# Patient Record
Sex: MALE | Race: WHITE | NOT HISPANIC OR LATINO
[De-identification: names, ages, dates, MRNs, and addresses within clinical notes are randomized per-mention and may not be internally consistent; named-entity substitution may affect disease eponyms.]

---

## 2019-06-21 ENCOUNTER — TRANSCRIPTION ENCOUNTER (OUTPATIENT)
Age: 44
End: 2019-06-21

## 2020-09-28 ENCOUNTER — EMERGENCY (EMERGENCY)
Facility: HOSPITAL | Age: 45
LOS: 0 days | Discharge: HOME | End: 2020-09-28
Payer: OTHER MISCELLANEOUS

## 2020-09-28 VITALS
RESPIRATION RATE: 19 BRPM | SYSTOLIC BLOOD PRESSURE: 120 MMHG | OXYGEN SATURATION: 98 % | WEIGHT: 175.05 LBS | DIASTOLIC BLOOD PRESSURE: 69 MMHG | TEMPERATURE: 98 F | HEART RATE: 88 BPM

## 2020-09-28 PROCEDURE — 99283 EMERGENCY DEPT VISIT LOW MDM: CPT

## 2020-09-28 PROCEDURE — 73080 X-RAY EXAM OF ELBOW: CPT | Mod: 26,RT

## 2020-09-28 RX ORDER — IBUPROFEN 200 MG
600 TABLET ORAL ONCE
Refills: 0 | Status: COMPLETED | OUTPATIENT
Start: 2020-09-28 | End: 2020-09-28

## 2020-09-28 RX ORDER — TETANUS TOXOID, REDUCED DIPHTHERIA TOXOID AND ACELLULAR PERTUSSIS VACCINE, ADSORBED 5; 2.5; 8; 8; 2.5 [IU]/.5ML; [IU]/.5ML; UG/.5ML; UG/.5ML; UG/.5ML
0.5 SUSPENSION INTRAMUSCULAR ONCE
Refills: 0 | Status: COMPLETED | OUTPATIENT
Start: 2020-09-28 | End: 2020-09-28

## 2020-09-28 RX ORDER — ERYTHROMYCIN BASE 5 MG/GRAM
1 OINTMENT (GRAM) OPHTHALMIC (EYE)
Qty: 1 | Refills: 0
Start: 2020-09-28 | End: 2020-10-02

## 2020-09-28 RX ADMIN — TETANUS TOXOID, REDUCED DIPHTHERIA TOXOID AND ACELLULAR PERTUSSIS VACCINE, ADSORBED 0.5 MILLILITER(S): 5; 2.5; 8; 8; 2.5 SUSPENSION INTRAMUSCULAR at 12:06

## 2020-09-28 RX ADMIN — Medication 600 MILLIGRAM(S): at 12:06

## 2020-09-28 NOTE — ED PROVIDER NOTE - CARE PROVIDER_API CALL
Dino Keys (DO)  Orthopaedic Surgery  [address] [place] [address]  Phone: [phone]  Fax: [phone]  Follow Up Time: 1-3 Days

## 2020-09-28 NOTE — ED PROVIDER NOTE - CARE PLAN
Principal Discharge DX:	Elbow injury, right, initial encounter  Secondary Diagnosis:	Corneal abrasion, left, initial encounter

## 2020-09-28 NOTE — ED ADULT TRIAGE NOTE - CHIEF COMPLAINT QUOTE
patient c/o of L eye pain and R elbow , patient stated " I slept down the crick like 12 feet , I did not fall, and one branches that I cut hit me on the eye , I wear the protective glasses but it got me" , this happened at work , denied hitting head no LOC patient c/o of L eye pain and R elbow , patient stated " I slipped down the crick like 12 feet , I did not fall, and one branches that I cut hit me on the eye , I wear the protective glasses but it got me" , this happened at work , denied hitting head no LOC

## 2020-09-28 NOTE — ED ADULT NURSE NOTE - NSIMPLEMENTINTERV_GEN_ALL_ED
Implemented All Universal Safety Interventions:  [place] to call system. Call bell, personal items and telephone within reach. Instruct patient to call for assistance. Room bathroom lighting operational. Non-slip footwear when patient is off stretcher. Physically safe environment: no spills, clutter or unnecessary equipment. Stretcher in lowest position, wheels locked, appropriate side rails in place.

## 2020-09-28 NOTE — ED PROVIDER NOTE - MUSCULOSKELETAL MINIMAL EXAM
+ TTP right elbow joint with decreased supination due to pain. Full flexion and extension. no TTP right wrist or shoulder. radial pulses equal and intact bilaterally.

## 2020-09-28 NOTE — ED ADULT NURSE NOTE - CHIEF COMPLAINT QUOTE
patient c/o of L eye pain and R elbow , patient stated " I slipped down the crick like 12 feet , I did not fall, and one branches that I cut hit me on the eye , I wear the protective glasses but it got me" , this happened at work , denied hitting head no LOC
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
## 2020-09-28 NOTE — ED ADULT NURSE NOTE - OBJECTIVE STATEMENT
pt A&Ox4, ambulating. pt c/o left eye pain and right elbow pain, pain 10. pt states pain feels sharp and aching. 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

## 2020-09-28 NOTE — ED PROVIDER NOTE - PATIENT PORTAL LINK FT
You can access the FollowMyHealth Patient Portal offered by [facility] by registering at the following website: [email or web address]. By joining [employer]’s FollowMyHealth portal, you will also be able to view your health information using other applications (apps) compatible with our system.

## 2020-09-28 NOTE — ED PROVIDER NOTE - CLINICAL SUMMARY MEDICAL DECISION MAKING FREE TEXT BOX
44 yo male with no significant pmh presents to the ED c/o right elbow pain and left eye pain s/p injury at work prior to arrival.  A/P: right elbow xray neg for acute fracture, placed in sling. left eye: + corneal abrasion, neg bernardo sign, no FB. Dced with erythromycin ointment. Recommend follow up with ortho and ophtho. Patient understands return precautions.

## 2020-09-28 NOTE — ED PROVIDER NOTE - NSFOLLOWUPCLINICS_GEN_ALL_ED_FT
[facility]  Ophthalmology  [address] [place] [address] [address]  [place] [address]  Phone: [phone]  Fax:   Follow Up Time: 1-3 Days
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
## 2020-09-29 DIAGNOSIS — S50.901A UNSPECIFIED SUPERFICIAL INJURY OF RIGHT ELBOW, INITIAL ENCOUNTER: ICD-10-CM

## 2020-09-29 DIAGNOSIS — S05.02XA INJURY OF CONJUNCTIVA AND CORNEAL ABRASION WITHOUT FOREIGN BODY, LEFT EYE, INITIAL ENCOUNTER: ICD-10-CM

## 2020-09-29 DIAGNOSIS — H57.11 OCULAR PAIN, RIGHT EYE: ICD-10-CM

## 2020-09-29 DIAGNOSIS — Z23 ENCOUNTER FOR IMMUNIZATION: ICD-10-CM
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
## 2021-02-09 ENCOUNTER — APPOINTMENT (OUTPATIENT)
Dept: INTERNAL MEDICINE | Facility: CLINIC | Age: 46
End: 2021-02-09
Payer: COMMERCIAL

## 2021-02-09 VITALS
SYSTOLIC BLOOD PRESSURE: 129 MMHG | DIASTOLIC BLOOD PRESSURE: 81 MMHG | WEIGHT: 192 LBS | HEIGHT: 72 IN | BODY MASS INDEX: 26.01 KG/M2 | OXYGEN SATURATION: 98 % | HEART RATE: 96 BPM | TEMPERATURE: 98.4 F

## 2021-02-09 DIAGNOSIS — Z82.49 FAMILY HISTORY OF ISCHEMIC HEART DISEASE AND OTHER DISEASES OF THE CIRCULATORY SYSTEM: ICD-10-CM

## 2021-02-09 DIAGNOSIS — Z83.438 FAMILY HISTORY OF OTHER DISORDER OF LIPOPROTEIN METABOLISM AND OTHER LIPIDEMIA: ICD-10-CM

## 2021-02-09 DIAGNOSIS — Z83.3 FAMILY HISTORY OF DIABETES MELLITUS: ICD-10-CM

## 2021-02-09 DIAGNOSIS — Z72.0 TOBACCO USE: ICD-10-CM

## 2021-02-09 DIAGNOSIS — Z00.00 ENCOUNTER FOR GENERAL ADULT MEDICAL EXAMINATION W/OUT ABNORMAL FINDINGS: ICD-10-CM

## 2021-02-09 DIAGNOSIS — Z11.3 ENCOUNTER FOR SCREENING FOR INFECTIONS WITH A PREDOMINANTLY SEXUAL MODE OF TRANSMISSION: ICD-10-CM

## 2021-02-09 DIAGNOSIS — Z11.59 ENCOUNTER FOR SCREENING FOR OTHER VIRAL DISEASES: ICD-10-CM

## 2021-02-09 DIAGNOSIS — R53.83 OTHER FATIGUE: ICD-10-CM

## 2021-02-09 PROCEDURE — 36415 COLL VENOUS BLD VENIPUNCTURE: CPT

## 2021-02-09 PROCEDURE — 99386 PREV VISIT NEW AGE 40-64: CPT | Mod: 25

## 2021-02-09 PROCEDURE — 99072 ADDL SUPL MATRL&STAF TM PHE: CPT

## 2021-02-10 DIAGNOSIS — R79.89 OTHER SPECIFIED ABNORMAL FINDINGS OF BLOOD CHEMISTRY: ICD-10-CM

## 2021-02-10 DIAGNOSIS — E78.5 HYPERLIPIDEMIA, UNSPECIFIED: ICD-10-CM

## 2021-02-10 LAB
25(OH)D3 SERPL-MCNC: 22.1 NG/ML
ALBUMIN SERPL ELPH-MCNC: 4.8 G/DL
ALP BLD-CCNC: 103 U/L
ALT SERPL-CCNC: 19 U/L
ANION GAP SERPL CALC-SCNC: 20 MMOL/L
AST SERPL-CCNC: 18 U/L
BASOPHILS # BLD AUTO: 0.04 K/UL
BASOPHILS NFR BLD AUTO: 0.4 %
BILIRUB SERPL-MCNC: <0.2 MG/DL
BUN SERPL-MCNC: 17 MG/DL
C TRACH RRNA SPEC QL NAA+PROBE: NOT DETECTED
CALCIUM SERPL-MCNC: 9.2 MG/DL
CHLORIDE SERPL-SCNC: 107 MMOL/L
CHOLEST SERPL-MCNC: 231 MG/DL
CO2 SERPL-SCNC: 16 MMOL/L
CREAT SERPL-MCNC: 0.83 MG/DL
EOSINOPHIL # BLD AUTO: 0.2 K/UL
EOSINOPHIL NFR BLD AUTO: 2.1 %
ESTIMATED AVERAGE GLUCOSE: 108 MG/DL
FERRITIN SERPL-MCNC: 96 NG/ML
FOLATE SERPL-MCNC: 9 NG/ML
GLUCOSE SERPL-MCNC: 86 MG/DL
HAV IGM SER QL: NONREACTIVE
HBA1C MFR BLD HPLC: 5.4 %
HBV CORE IGM SER QL: NONREACTIVE
HBV SURFACE AG SER QL: NONREACTIVE
HCT VFR BLD CALC: 51.3 %
HCV AB SER QL: NONREACTIVE
HCV S/CO RATIO: 0.08 S/CO
HDLC SERPL-MCNC: 56 MG/DL
HGB BLD-MCNC: 15.9 G/DL
HIV1+2 AB SPEC QL IA.RAPID: NONREACTIVE
IMM GRANULOCYTES NFR BLD AUTO: 0.2 %
IRON SATN MFR SERPL: 15 %
IRON SERPL-MCNC: 41 UG/DL
IRON SERPL-MCNC: 59 UG/DL
LDLC SERPL CALC-MCNC: 151 MG/DL
LYMPHOCYTES # BLD AUTO: 2.59 K/UL
LYMPHOCYTES NFR BLD AUTO: 27.2 %
MAN DIFF?: NORMAL
MCHC RBC-ENTMCNC: 26.2 PG
MCHC RBC-ENTMCNC: 31 GM/DL
MCV RBC AUTO: 84.7 FL
MONOCYTES # BLD AUTO: 0.57 K/UL
MONOCYTES NFR BLD AUTO: 6 %
N GONORRHOEA RRNA SPEC QL NAA+PROBE: NOT DETECTED
NEUTROPHILS # BLD AUTO: 6.09 K/UL
NEUTROPHILS NFR BLD AUTO: 64.1 %
NONHDLC SERPL-MCNC: 175 MG/DL
PLATELET # BLD AUTO: 282 K/UL
POTASSIUM SERPL-SCNC: 4.4 MMOL/L
PROT SERPL-MCNC: 7.1 G/DL
RBC # BLD: 6.06 M/UL
RBC # FLD: 14 %
SARS-COV-2 IGG SERPL IA-ACNC: 0.06 INDEX
SARS-COV-2 IGG SERPL QL IA: NEGATIVE
SODIUM SERPL-SCNC: 142 MMOL/L
SOURCE AMPLIFICATION: NORMAL
T PALLIDUM AB SER QL IA: NEGATIVE
TIBC SERPL-MCNC: 400 UG/DL
TRIGL SERPL-MCNC: 121 MG/DL
TSH SERPL-ACNC: 2.76 UIU/ML
UIBC SERPL-MCNC: 341 UG/DL
VIT B12 SERPL-MCNC: 440 PG/ML
WBC # FLD AUTO: 9.51 K/UL

## 2021-09-27 ENCOUNTER — EMERGENCY (EMERGENCY)
Facility: HOSPITAL | Age: 46
LOS: 0 days | Discharge: HOME | End: 2021-09-27
Attending: EMERGENCY MEDICINE
Payer: OTHER MISCELLANEOUS

## 2021-09-27 ENCOUNTER — NON-APPOINTMENT (OUTPATIENT)
Age: 46
End: 2021-09-27

## 2021-09-27 VITALS
HEIGHT: 72 IN | RESPIRATION RATE: 20 BRPM | SYSTOLIC BLOOD PRESSURE: 160 MMHG | TEMPERATURE: 98 F | OXYGEN SATURATION: 97 % | WEIGHT: 190.04 LBS | HEART RATE: 117 BPM | DIASTOLIC BLOOD PRESSURE: 80 MMHG

## 2021-09-27 DIAGNOSIS — T78.40XA ALLERGY, UNSPECIFIED, INITIAL ENCOUNTER: ICD-10-CM
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
## 2021-09-27 DIAGNOSIS — Z91.030 BEE ALLERGY STATUS: ICD-10-CM

## 2021-09-27 PROCEDURE — 99291 CRITICAL CARE FIRST HOUR: CPT

## 2021-09-27 RX ORDER — SODIUM CHLORIDE 9 MG/ML
1000 INJECTION INTRAMUSCULAR; INTRAVENOUS; SUBCUTANEOUS ONCE
Refills: 0 | Status: DISCONTINUED | OUTPATIENT
Start: 2021-09-27 | End: 2021-09-27

## 2021-09-27 RX ORDER — DIPHENHYDRAMINE HCL 50 MG
50 CAPSULE ORAL ONCE
Refills: 0 | Status: DISCONTINUED | OUTPATIENT
Start: 2021-09-27 | End: 2021-09-27

## 2021-09-27 RX ORDER — DEXAMETHASONE 0.5 MG/5ML
10 ELIXIR ORAL ONCE
Refills: 0 | Status: DISCONTINUED | OUTPATIENT
Start: 2021-09-27 | End: 2021-09-27

## 2021-09-27 RX ORDER — FAMOTIDINE 10 MG/ML
20 INJECTION INTRAVENOUS ONCE
Refills: 0 | Status: DISCONTINUED | OUTPATIENT
Start: 2021-09-27 | End: 2021-09-27

## 2021-09-27 RX ORDER — DIPHENHYDRAMINE HCL 50 MG
1 CAPSULE ORAL
Qty: 15 | Refills: 0
Start: 2021-09-27 | End: 2021-10-01

## 2021-09-27 RX ORDER — EPINEPHRINE 0.3 MG/.3ML
0.3 INJECTION INTRAMUSCULAR; SUBCUTANEOUS
Qty: 1 | Refills: 0
Start: 2021-09-27 | End: 2021-09-27

## 2021-09-27 RX ORDER — DIPHENHYDRAMINE HCL 50 MG
50 CAPSULE ORAL ONCE
Refills: 0 | Status: COMPLETED | OUTPATIENT
Start: 2021-09-27 | End: 2021-09-27

## 2021-09-27 RX ORDER — DEXAMETHASONE 0.5 MG/5ML
10 ELIXIR ORAL ONCE
Refills: 0 | Status: COMPLETED | OUTPATIENT
Start: 2021-09-27 | End: 2021-09-27

## 2021-09-27 RX ORDER — EPINEPHRINE 0.3 MG/.3ML
0.3 INJECTION INTRAMUSCULAR; SUBCUTANEOUS ONCE
Refills: 0 | Status: COMPLETED | OUTPATIENT
Start: 2021-09-27 | End: 2021-09-27

## 2021-09-27 RX ORDER — FAMOTIDINE 10 MG/ML
20 INJECTION INTRAVENOUS ONCE
Refills: 0 | Status: COMPLETED | OUTPATIENT
Start: 2021-09-27 | End: 2021-09-27

## 2021-09-27 RX ADMIN — Medication 10 MILLIGRAM(S): at 10:22

## 2021-09-27 RX ADMIN — FAMOTIDINE 20 MILLIGRAM(S): 10 INJECTION INTRAVENOUS at 10:22

## 2021-09-27 RX ADMIN — EPINEPHRINE 0.3 MILLIGRAM(S): 0.3 INJECTION INTRAMUSCULAR; SUBCUTANEOUS at 09:33

## 2021-09-27 RX ADMIN — Medication 50 MILLIGRAM(S): at 10:21

## 2021-09-27 NOTE — ED PROVIDER NOTE - CARE PROVIDER_API CALL
REGINA CARIAS  Allergy  [address], [address] [place] [address]  Phone: [phone]  Fax: [phone]  Follow Up Time: 1-3 Days

## 2021-09-27 NOTE — ED PROVIDER NOTE - OBJECTIVE STATEMENT
46 year old male w/PMH of allergy to bees presents to the ED for allergic reaction. Pt was working for the [employer] and must've hit a bee hive and was swarmed x10 minutes prior to ED presentation. Pt with difficulty breathing and throat discomfort, states he usually has epi pen on him but not today. Denies itching or rash. Pt reports pain around multiple bee stings of hand, neck and face, likely about x5-6 stings as per pt.

## 2021-09-27 NOTE — ED PROVIDER NOTE - CLINICAL SUMMARY MEDICAL DECISION MAKING FREE TEXT BOX
Pt given IM epinephrine, decadron and benadryl. Will observe in ED for continued improvement. Discharge on benadryl upon continued improvement and will refill epi pen to pharmacy. Pt given IM epinephrine, decadron and benadryl for possible early anaphylactic reaction. Will observe in ED for continued improvement. Discharge on benadryl upon continued improvement and will refill epi pen to pharmacy. Pt given IM epinephrine, decadron and benadryl for possible early anaphylactic reaction. Will observe in ED for continued improvement. Discharge on benadryl upon continued improvement and will refill epi pen to pharmacy.     Pt observed in ED for 4 hours without recurrence of symptoms - will dc .

## 2021-09-27 NOTE — ED PROVIDER NOTE - NSFOLLOWUPINSTRUCTIONS_ED_ALL_ED_FT
Anaphylaxis    WHAT YOU NEED TO KNOW:    Anaphylaxis is a life-threatening allergic reaction that must be treated immediately. Your risk for anaphylaxis increases if you have asthma that is severe or not controlled. Medical conditions such as heart disease can also increase your risk. It is important to be prepared if you are at risk for anaphylaxis. Your symptoms can be worse each time you are exposed to the trigger.     DISCHARGE INSTRUCTIONS:    Steps to take for signs or symptoms of anaphylaxis:   •Immediately give 1 shot of epinephrine only into the outer thigh muscle. Even if your allergic reaction seems mild, it can quickly become anaphylaxis. This may happen even if you had a mild reaction to the allergen in the past. Each exposure can cause a different reaction. Watch for signs and symptoms of anaphylaxis every time you are exposed to a trigger. Be ready to give a shot of epinephrine. It is okay to inject epinephrine through clothing. Just be careful to avoid seams, zippers, or other parts that can prevent the needle from entering your skin.  How to Give An Epinephrine Shot Adult           •Leave the shot in place as directed. Your healthcare provider may recommend you leave it in place for up to 10 seconds before you remove it. This helps make sure all of the epinephrine is delivered.       •Call 911 and go to the emergency department, even if the shot improved symptoms. Do not drive yourself. Bring the used epinephrine shot with you.       Call 911 for any of the following:   •You have a skin rash, hives, swelling, or itching.       •You have trouble breathing, shortness of breath, wheezing, or coughing.      •Your throat tightens or your lips or tongue swell.      •You have difficulty swallowing or speaking.      •You are dizzy, lightheaded, confused, or feel like you are going to faint.      •You have nausea, diarrhea, or abdominal cramps, or you are vomiting.      Return to the emergency department if:   •Signs or symptoms of anaphylaxis return.           Contact your healthcare provider if:   •You have questions or concerns about your condition or care.          Medicines:   •Epinephrine is medicine used to treat severe allergic reactions such as anaphylaxis. It is given as a shot into the outer thigh muscle.      •Medicines such as antihistamines, steroids, and bronchodilators decrease inflammation, open airways, and make breathing easier.      •Take your medicine as directed. Contact your healthcare provider if you think your medicine is not helping or if you have side effects. Tell him or her if you are allergic to any medicine. Keep a list of the medicines, vitamins, and herbs you take. Include the amounts, and when and why you take them. Bring the list or the pill bottles to follow-up visits. Carry your medicine list with you in case of an emergency.      Follow up with your healthcare provider as directed: Allergy testing may reveal allergies that can trigger anaphylaxis. Write down your questions so you remember to ask them during your visits.     Safety precautions:   •Keep 2 shots of epinephrine with you at all times. You may need a second shot, because epinephrine only works for about 20 minutes and symptoms may return. Your healthcare provider can show you and family members how to give the shot. Check the expiration date every month and replace it before it expires.      •Create an action plan. Your healthcare provider can help you create a written plan that explains the allergy and an emergency plan to treat a reaction. The plan explains when to give a second epinephrine shot if symptoms return or do not improve after the first. Give copies of the action plan and emergency instructions to family members, and work or school staff. Show them how to give a shot of epinephrine in case you are not able to give it to yourself.      •Be careful when you exercise. If you have had exercise-induced anaphylaxis, do not exercise right after you eat. Stop exercising right away if you start to develop any signs or symptoms of anaphylaxis. You may first feel tired, warm, or have itchy skin. Hives, swelling, and severe breathing problems may develop if you continue to exercise.      •Carry medical alert identification. Wear medical alert jewelry or carry a card that explains the allergy. Ask your healthcare provider where to get these items.   Medical Alert Jewelry           •Identify and avoid known triggers. Read food labels for ingredients. Look for triggers in your environment.      •Ask about treatments to prevent anaphylaxis. You may need allergy shots or other medicines to treat allergies.

## 2021-09-27 NOTE — ED PROVIDER NOTE - PROGRESS NOTE DETAILS
improved symptoms after IM epinephrine - will give meds both orally and IM as pt was hard stick, would not like IV placed, otherwise improved clinically - so will medicate via alternative route.

## 2021-09-27 NOTE — ED ADULT TRIAGE NOTE - CHIEF COMPLAINT QUOTE
Bee sting allergy, throat closing sensation H/O Bee allergies, presents to triage purple with throat closing sensation

## 2021-09-27 NOTE — ED PROVIDER NOTE - CONSTITUTIONAL, MLM
normal... Well appearing, awake, alert, oriented to person, place, time/situation, Mild distress secondary to difficulty swallowing and speaking, immediately ordered for IM epinephrine 0.3mg

## 2021-09-27 NOTE — ED PROVIDER NOTE - SKIN, MLM
Skin normal color for race, warm, dry and intact. Multiple stings, slightly raised areas of skin congruent with bee stings around neck, face and right hand

## 2021-09-27 NOTE — ED ADULT NURSE NOTE - OBJECTIVE STATEMENT
Assisting primary RN. 46 year old male w/PMH of allergy to bees presents to the ED for allergic reaction. Pt was working for the [employer] and must've hit a bee hive and was swarmed x10 minutes prior to ED presentation. Pt with difficulty breathing and throat discomfort, states he usually has epi pen on him but not today. Denies itching or rash. Pt reports pain around multiple bee stings of hand, neck and face, likely about x5-6 stings as per pt

## 2021-09-27 NOTE — ED PROVIDER NOTE - PATIENT PORTAL LINK FT
You can access the FollowMyHealth Patient Portal offered by [facility] by registering at the following website: [email or web address]. By joining [employer]’s FollowMyHealth portal, you will also be able to view your health information using other applications (apps) compatible with our system.

## 2021-09-27 NOTE — ED PROVIDER NOTE - CRITICAL CARE ATTENDING CONTRIBUTION TO CARE
early anaphylactic reaction, was able to be  improved with IM epinephrine with improved status, observed in ED for 4-5 hours to ensure no further reaction occurrence.

## 2022-01-05 NOTE — ED ADULT NURSE NOTE - AS SC BRADEN MOBILITY
What Type Of Note Output Would You Prefer (Optional)?: Standard Output (4) no limitation What Is The Reason For Today's Visit?: Full Body Skin Examination What Is The Reason For Today's Visit? (Being Monitored For X): surveillance against the recurrence of atypical nevi How Severe Are Your Spot(S)?: mild

## 2022-01-26 NOTE — ED ADULT TRIAGE NOTE - ESI TRIAGE ACUITY LEVEL, MLM
He returns today for evaluation after EMG of his right arm which demonstrates right carpal tunnel syndrome which is moderately severe. He also is noted to have the same degree of issues with the left hand. That hand on left side is relatively asymptomatic. At this time because of failure to improve with conservative care over several months with persistent pain despite conservative management some medication and bracing I would recommend proceeding with right carpal tunnel release. The patient was counseled at length about the risks of marcella Covid-19 during their perioperative period and any recovery window from their procedure. The patient was made aware that marcella Covid-19  may worsen their prognosis for recovering from their procedure  and lend to a higher morbidity and/or mortality risk. All material risks, benefits, and reasonable alternatives including postponing the procedure were discussed. The patient does wish to proceed with the procedure at this time. INFORMED CONSENT NOTE        We discussed the risks, benefits, and alternatives to the proposed procedure, as well as the necessity of other members of the healthcare team participating in the procedure. All questions were answered and the patient elected to proceed with the proposed procedure and signed the informed consent form.
4

## 2022-04-11 PROBLEM — Z11.59 SCREENING FOR VIRAL DISEASE: Status: ACTIVE | Noted: 2021-02-09

## 2022-06-20 NOTE — ED ADULT NURSE NOTE - FINAL NURSING ELECTRONIC SIGNATURE
Task deferred to schedulers to place pt for last minute cancellation list via staff message.    Replied to pt via [employer] requesting more information regarding concerns, awaiting pt response.     28-Sep-2020 12:42

## 2023-05-20 ENCOUNTER — EMERGENCY (EMERGENCY)
Facility: HOSPITAL | Age: 48
LOS: 1 days | Discharge: HOME | End: 2023-05-20
Admitting: EMERGENCY MEDICINE
Payer: COMMERCIAL

## 2023-05-20 VITALS
TEMPERATURE: 98 F | RESPIRATION RATE: 18 BRPM | HEART RATE: 101 BPM | SYSTOLIC BLOOD PRESSURE: 156 MMHG | DIASTOLIC BLOOD PRESSURE: 90 MMHG | OXYGEN SATURATION: 100 %

## 2023-05-20 VITALS
TEMPERATURE: 98 F | SYSTOLIC BLOOD PRESSURE: 151 MMHG | RESPIRATION RATE: 16 BRPM | DIASTOLIC BLOOD PRESSURE: 100 MMHG | HEART RATE: 109 BPM | OXYGEN SATURATION: 100 %

## 2023-05-20 PROCEDURE — 70486 CT MAXILLOFACIAL W/O DYE: CPT | Mod: 26,MG

## 2023-05-20 PROCEDURE — 99284 EMERGENCY DEPT VISIT MOD MDM: CPT

## 2023-05-20 PROCEDURE — G1004: CPT

## 2023-05-20 RX ORDER — NICOTINE POLACRILEX 2 MG
4 GUM BUCCAL ONCE
Refills: 0 | Status: COMPLETED | OUTPATIENT
Start: 2023-05-20 | End: 2023-05-20

## 2023-05-20 RX ADMIN — Medication 4 MILLIGRAM(S): at 19:50

## 2023-05-20 NOTE — ED PROVIDER NOTE - CLINICAL SUMMARY MEDICAL DECISION MAKING FREE TEXT BOX
46 y/o M    CT maxillofacial .  Medical evaluation performed. There is no clinical evidence of intoxication or any acute medical problem requiring immediate intervention.  D/C home

## 2023-05-20 NOTE — ED PROVIDER NOTE - OBJECTIVE STATEMENT
48 y/o M   present to  ER w c/o right jaw pain .  Admit that he was recently discharged from a rehab facility 2 days ago fro heroin abuse  . States that he slipped and fell hitting his right jar. Denies hitting his head. Denies SI/HI/AHVH. Denies punching or kicking any objects. Denies  SOB,  fever, chills, chest/abdominal discomfort. Denies recent use of alcohol or illicit drugs. No evidence of physical injuries,  broken skin or deformities.

## 2023-05-20 NOTE — ED ADULT NURSE NOTE - OBJECTIVE STATEMENT
Received pt in  pt requesting Received pt in  pt calm denies si/hi/avh presently, pt requesting ct scan safety & comfort measures maintained eval on going.
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
## 2023-05-20 NOTE — ED ADULT NURSE NOTE - NSFALLUNIVINTERV_ED_ALL_ED
Bed/Stretcher in lowest position, wheels locked, appropriate side rails in place/Call bell, personal items and telephone in reach/Instruct patient to call for assistance before getting out of bed/chair/stretcher/Non-slip footwear applied when patient is off stretcher/[place] to call system/Physically safe environment - no spills, clutter or unnecessary equipment/Purposeful proactive rounding/Room/bathroom lighting operational, light cord in reach

## 2023-05-20 NOTE — ED PROVIDER NOTE - PATIENT PORTAL LINK FT
You can access the FollowMyHealth Patient Portal offered by [facility] by registering at the following website: [email or web address]. By joining [employer]’s FollowMyHealth portal, you will also be able to view your health information using other applications (apps) compatible with our system.

## 2023-05-21 NOTE — ED POST DISCHARGE NOTE - REASON FOR FOLLOW-UP
patient returned to the ED requesting a copy of his discharge papers-papers printed and given to him by martha wiggins. Other

## 2024-08-13 ENCOUNTER — EMERGENCY (EMERGENCY)
Facility: HOSPITAL | Age: 49
LOS: 1 days | Discharge: HOME | End: 2024-08-13
Attending: STUDENT IN AN ORGANIZED HEALTH CARE EDUCATION/TRAINING PROGRAM | Admitting: STUDENT IN AN ORGANIZED HEALTH CARE EDUCATION/TRAINING PROGRAM
Payer: COMMERCIAL

## 2024-08-13 VITALS
WEIGHT: 175.05 LBS | HEIGHT: 72 IN | OXYGEN SATURATION: 98 % | DIASTOLIC BLOOD PRESSURE: 85 MMHG | RESPIRATION RATE: 16 BRPM | HEART RATE: 92 BPM | TEMPERATURE: 98 F | SYSTOLIC BLOOD PRESSURE: 135 MMHG

## 2024-08-13 PROBLEM — F19.10 OTHER PSYCHOACTIVE SUBSTANCE ABUSE, UNCOMPLICATED: Chronic | Status: ACTIVE | Noted: 2023-05-21

## 2024-08-13 PROCEDURE — 99284 EMERGENCY DEPT VISIT MOD MDM: CPT

## 2024-08-13 PROCEDURE — 99283 EMERGENCY DEPT VISIT LOW MDM: CPT

## 2024-08-13 RX ORDER — FLUORESCEIN SODIUM 2 %
1 DROPS OPHTHALMIC (EYE) ONCE
Refills: 0 | Status: COMPLETED | OUTPATIENT
Start: 2024-08-13 | End: 2024-08-13

## 2024-08-13 RX ORDER — CIPROFLOXACIN 3 MG/ML
2 SOLUTION OPHTHALMIC ONCE
Refills: 0 | Status: COMPLETED | OUTPATIENT
Start: 2024-08-13 | End: 2024-08-13

## 2024-08-13 RX ORDER — TETRACAINE HCL 0.5 %
1 DROPS OPHTHALMIC (EYE) ONCE
Refills: 0 | Status: COMPLETED | OUTPATIENT
Start: 2024-08-13 | End: 2024-08-13

## 2024-08-13 RX ORDER — CIPROFLOXACIN 3 MG/ML
2 SOLUTION OPHTHALMIC
Qty: 1 | Refills: 0
Start: 2024-08-13 | End: 2024-08-19

## 2024-08-13 RX ADMIN — Medication 1 DROP(S): at 07:40

## 2024-08-13 RX ADMIN — Medication 1 APPLICATION(S): at 07:40

## 2024-08-13 RX ADMIN — CIPROFLOXACIN 2 DROP(S): 3 SOLUTION OPHTHALMIC at 08:15

## 2024-08-13 NOTE — ED PROVIDER NOTE - CLINICAL SUMMARY MEDICAL DECISION MAKING FREE TEXT BOX
History and exam concerning for corneal abrasion.  No foreign body seen under the eyelid.  Likely in the setting of the blowing grass yesterday with concern for abrasion that worsened overnight.  Patient's vision after tetracaine is 20/20 bilaterally with his glasses and in each individual eye.  Will give ciprofloxacin drops, plan for discharge, recommend ophthalmology follow-up in 48 hours.

## 2024-08-13 NOTE — ED ADULT NURSE NOTE - OBJECTIVE STATEMENT
Pt came in ambulatory complains of redness , pain and discharge on his left eye started this morning,

## 2024-08-13 NOTE — ED ADULT NURSE NOTE - CAS EDP DISCH TYPE
Closure device placed in the right femoral artery. Site closed by VASCADE. Manual pressure held. Home

## 2024-08-13 NOTE — ED PROVIDER NOTE - NSFOLLOWUPINSTRUCTIONS_ED_ALL_ED_FT
Corneal Abrasion  An eye showing the cornea and eyelid.   A corneal abrasion is a scratch or injury to the clear tissue at the front of the eye (cornea). It can be very painful. The cornea protects the eye and helps the eye focus. The cornea is made up of many layers, but the surface layer is one of the most sensitive tissues in the body.    A corneal abrasion heals fast when it is treated. If it is not treated, it can become infected and cause an open sore (ulcer). This can lead to scarring, and the scarring can affect vision. Sometimes after the abrasion heals, another abrasion can come back in the same place.    What are the causes?  A corneal abrasion may be caused by:  A poke to the eye.  A gritty or irritating substance (foreign body) in the eye.  Too much eye rubbing.  Very dry eyes.  Certain eye infections.  Contact lenses that do not fit right or are worn for too long. Injury can also happen when putting in contact lenses or taking them out.  Eye surgery.  Certain cornea problems that make it more likely to get a corneal abrasion.  Sometimes, the cause is not known.    What are the signs or symptoms?  Symptoms of this condition include:  Eye pain. The pain may get worse when you open, close, or move your eye.  A feeling that something is poking your eye or is stuck in your eye.  Tearing, redness, and sensitivity to light.  Having trouble keeping your eye open, or not being able to keep it open.  Blurred vision.  Headache.  How is this diagnosed?  A corneal abrasion may be diagnosed based on your medical history, symptoms, and an eye exam. You may need to see a doctor who specializes in conditions of the eye (ophthalmologist or optometrist).    Before the eye exam, you may be given eye drops that numb the eye. You may also have dye put in your eye with a dropper or a small paper strip. This dye helps your eye doctor see your injury better when using a light or an eye scope (slit lamp).    How is this treated?  Treatment may vary based on what caused the corneal abrasion. It may include:  Washing out your eye.  Taking out anything that is stuck in your eye.  Using antibiotic drops or ointment to treat or prevent an infection.  Using eye drops that lessens inflammation and pain.  Using steroid drops or ointment to treat redness, irritation, or inflammation.  Applying a cold, wet cloth (cold compress) or ice pack to lessen the pain.  Taking pain medicines. This may include over-the-counter medicines like ibuprofen. If you have a large or deep corneal abrasion, an opioid medicine may be prescribed.  For large abrasions, an eye patch or a bandage soft contact lens might also be used. A bandage soft contact lens protects the cornea while it is healing. These are not used if the corneal abrasion is related to wearing contact lenses. This is because you may be more likely to get an eye infection.    Follow these instructions at home:  Medicines    If you were prescribed eye drops or ointment, use them as told by your provider. You may be told to use:  Antibiotic eye drops or ointment. Do not stop using them even if you start to feel better.  Eye drops that moisten the eye (lubricating eye drops).  Ask your provider if the medicine prescribed to you:  Requires you to avoid driving or using machinery.  Can cause constipation. You may need to take these actions to prevent or treat constipation:  Drink enough fluid to keep your pee (urine) pale yellow.  Take over-the-counter or prescription medicines.  Eat foods that are high in fiber, such as beans, whole grains, and fresh fruits and vegetables.  Limit foods that are high in fat and processed sugars, such as fried or sweet foods.  Take over-the-counter and prescription medicines only as told by your provider.  Eye care    Rest your eyes. Avoid bright light and overusing (straining) your eyes. Wear sunglasses.  Do not rub or touch your eye. Do not wash out your eye.  Ask your provider if you can use a cold compress on your eye to lessen pain.  If you have an eye patch:  Wear it as told by your provider.  Follow your provider's instructions about when to take it off.  If you have a bandage soft contact lens, your provider will take it off during your follow-up visit.  Do not wear your own contact lenses until your provider says it is okay.  General instructions    Do not drive or use machinery as told by your provider. You may not be able to  distances as well if your vision is affected or if you are wearing an eye patch.  Keep all follow-up visits. This helps to prevent infection and vision loss.  Contact a health care provider if:  You keep having eye pain and other symptoms for more than 2–3 days.  You have new symptoms, such as more redness, tearing, or fluid (discharge) coming from your eye.  You have swollen eyelids.  Your vision gets much worse.  You have discharge that makes your eyelids stick together in the morning.  Your eye patch becomes so loose that you can blink your eye.  Symptoms come back after your abrasion heals.  Get help right away if:  You have severe eye pain that does not get better with medicine.  You have severe vision loss.  This information is not intended to replace advice given to you by your health care provider. Make sure you discuss any questions you have with your health care provider.

## 2024-08-13 NOTE — ED PROVIDER NOTE - NSFOLLOWUPCLINICS_GEN_ALL_ED_FT
[facility] - Ophthalmology  Ophthalmology  [address]  [place] [address]  Phone: [phone]  Fax:

## 2024-08-13 NOTE — ED PROVIDER NOTE - PATIENT PORTAL LINK FT
You can access the FollowMyHealth Patient Portal offered by [facility] by registering at the following website: [email or web address]. By joining [employer]’s FollowMyHealth portal, you will also be able to view your health information using other applications (apps) compatible with our system.

## 2024-08-13 NOTE — ED PROVIDER NOTE - PHYSICAL EXAMINATION
Constitutional: Awake, Alert, non-toxic. No acute distress.  HEAD: Normocephalic, atraumatic.   EYES: EOM intact, left conjunctiva and sclera with minimal erythema. very small fluorescein uptake just medial to iris of left eye, 20/20 with glasses b/l and each individual eye  ENT: External ears normal. No rhinorrhea, no tracheal deviation   NECK: Supple, non-tender  CARDIOVASCULAR: regular rate  RESPIRATORY: Normal respiratory effort; Speaking in full sentences. No accessory muscle use.   MSK:  no lower extremity edema, no deformities  SKIN: Warm, dry  NEURO: A&O x3. Sensory and motor functions are grossly intact. Speech is normal.  PSYCH: Appearance and judgement seem appropriate for gender and age.

## 2024-08-13 NOTE — ED ADULT NURSE NOTE - NS ED NURSE DC INFO COMPLEXITY
Patient came in office requesting these ointments and creams. Simple: Patient demonstrates quick and easy understanding/Verbalized Understanding

## 2024-08-13 NOTE — ED PROVIDER NOTE - OBJECTIVE STATEMENT
Patient with no significant past medical history who does not wear contacts is presenting with pain, drainage and erythema to left eye.  Noted this morning when he woke up.  States it hurts when he is looking around or closes his eye.  Feels that it is hard to see because of the pain and burning.  No other complaints or issues.  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  Did not note the symptoms till overnight.

## 2025-02-07 ENCOUNTER — EMERGENCY (EMERGENCY)
Facility: HOSPITAL | Age: 50
LOS: 1 days | Discharge: HOME | End: 2025-02-07
Admitting: EMERGENCY MEDICINE
Payer: COMMERCIAL

## 2025-02-07 VITALS
OXYGEN SATURATION: 97 % | SYSTOLIC BLOOD PRESSURE: 110 MMHG | RESPIRATION RATE: 15 BRPM | DIASTOLIC BLOOD PRESSURE: 68 MMHG | TEMPERATURE: 98 F | HEART RATE: 52 BPM

## 2025-02-07 VITALS
TEMPERATURE: 98 F | OXYGEN SATURATION: 100 % | SYSTOLIC BLOOD PRESSURE: 103 MMHG | RESPIRATION RATE: 18 BRPM | WEIGHT: 154.98 LBS | DIASTOLIC BLOOD PRESSURE: 63 MMHG | HEART RATE: 86 BPM

## 2025-02-07 PROCEDURE — 99284 EMERGENCY DEPT VISIT MOD MDM: CPT

## 2025-02-07 PROCEDURE — 99283 EMERGENCY DEPT VISIT LOW MDM: CPT

## 2025-02-07 RX ORDER — VALACYCLOVIR 1000 MG/1
1 TABLET ORAL
Qty: 14 | Refills: 0
Start: 2025-02-07 | End: 2025-02-13

## 2025-02-07 NOTE — CONSULT NOTE ADULT - SUBJECTIVE AND OBJECTIVE BOX
[facility] DEPARTMENT OF OPHTHALMOLOGY - INITIAL ADULT CONSULT  ----------------------------------------------------------------------------------------------------  Alexandro Dc PGY-3  Available on teams  ----------------------------------------------------------------------------------------------------    HPI: 49-year-old male past medical history of facial trauma with reconstruction on 10/2024 presents to the ED complaining of left eye pain and redness.  Patient states he went to sleep and woke up with his eye red and tearing and states "he thinks something is stuck in his eye."  Patient endorses blurry vision to the affected eye.  He wears glasses and denies contact wear. Patient denies chest pain, shortness of breath, fevers, chills, nausea, vomiting, visual loss, headache or any other concerning symptoms at this time.    Reports hx of being told he has something similar to a "cold sore" in his left eye for which he took pills and used a drop.         PAST MEDICAL & SURGICAL HISTORY:  Polysubstance abuse      No significant past surgical history        Past Ocular History: None  Ophthalmic Medications: None  FAMILY HISTORY:        MEDICATIONS  (STANDING):    MEDICATIONS  (PRN):    Allergies & Intolerances:   No Known Drug Allergies  Bee stings (Anaphylaxis)    Review of Systems:  Constitutional: No fever, chills  Eyes: See HPI   Neuro: No tremors  Cardiovascular: No chest pain, palpitations  Respiratory: No SOB, no cough  GI: No nausea, vomiting, abdominal pain  : No dysuria  Skin: no rash  Psych: no depression  Endocrine: no polyuria, polydipsia  Heme/lymph: no swelling    VITALS: T(C): 36.4 (02-07-25 @ 10:59)  T(F): 97.5 (02-07-25 @ 10:59), Max: 98 (02-07-25 @ 05:39)  HR: 52 (02-07-25 @ 10:59) (52 - 86)  BP: 110/68 (02-07-25 @ 10:59) (103/63 - 110/68)  RR:  (15 - 18)  SpO2:  (97% - 100%)  Wt(kg): --  General: AAO x 3, appropriate mood and affect    Ophthalmology Exam:  Visual acuity (cc): 20/20 OD, 20/25+ OS   Pupils: PERRL OU, no APD  Ttono: 12 OD 13 OS   Extraocular movements (EOMs): Full OU, no pain, no diplopia  Confrontational Visual Field (CVF): Full OU  Color Plates: 12/12 OU    Pen Light Exam (PLE)  External: Flat OU  Lids/Lashes/Lacrimal Ducts: Flat OD; OS; REBECCA everted, slight conj irritation   Sclera/Conjunctiva: W+Q OD; OS: tr injection  Cornea: Cl OD; OS: inferior SPK   Anterior Chamber: D+F OU    Iris: Flat OU  Lens: Cl OU    Fundus Exam: dilated with 1% tropicamide and 2.5% phenylephrine  Approval obtained from primary team for dilation  Patient aware that pupils can remained dilated for at least 4-6 hours  Exam performed with 20D lens    Vitreous: wnl OU  Disc, cup/disc: sharp and pink, 0.35 OU  Macula: wnl OU  Vessels: wnl OU  Periphery: wnl OU   [facility] DEPARTMENT OF OPHTHALMOLOGY - INITIAL ADULT CONSULT  ----------------------------------------------------------------------------------------------------  Alexandro cD PGY-3  Available on teams  ----------------------------------------------------------------------------------------------------    HPI: 49-year-old male past medical history of facial trauma with reconstruction on 10/2024 presents to the ED complaining of left eye pain and redness.  Patient states he went to sleep and woke up with his eye red and tearing and states "he thinks something is stuck in his eye."  Patient endorses blurry vision to the affected eye.  He wears glasses and denies contact wear. Patient denies chest pain, shortness of breath, fevers, chills, nausea, vomiting, visual loss, headache or any other concerning symptoms at this time.    Reports hx of being told he has something similar to a "cold sore" in his left eye for which he took pills and used a drop.         PAST MEDICAL & SURGICAL HISTORY:  Polysubstance abuse      No significant past surgical history        Past Ocular History: None  Ophthalmic Medications: None  FAMILY HISTORY:        MEDICATIONS  (STANDING):    MEDICATIONS  (PRN):    Allergies & Intolerances:   No Known Drug Allergies  Bee stings (Anaphylaxis)    Review of Systems:  Constitutional: No fever, chills  Eyes: See HPI   Neuro: No tremors  Cardiovascular: No chest pain, palpitations  Respiratory: No SOB, no cough  GI: No nausea, vomiting, abdominal pain  : No dysuria  Skin: no rash  Psych: no depression  Endocrine: no polyuria, polydipsia  Heme/lymph: no swelling    VITALS: T(C): 36.4 (02-07-25 @ 10:59)  T(F): 97.5 (02-07-25 @ 10:59), Max: 98 (02-07-25 @ 05:39)  HR: 52 (02-07-25 @ 10:59) (52 - 86)  BP: 110/68 (02-07-25 @ 10:59) (103/63 - 110/68)  RR:  (15 - 18)  SpO2:  (97% - 100%)  Wt(kg): --  General: AAO x 3, appropriate mood and affect    Ophthalmology Exam:  Visual acuity (cc): 20/20 OD, 20/25+ OS   Pupils: PERRL OU, no APD  Ttono: 12 OD 13 OS   Extraocular movements (EOMs): Full OU, no pain, no diplopia  Confrontational Visual Field (CVF): Full OU  Color Plates: 12/12 OU    Pen Light Exam (PLE)  External: Flat OU  Lids/Lashes/Lacrimal Ducts: Flat OD; OS; REBECCA everted, 1+ superonasal conj injection    Sclera/Conjunctiva: W+Q OD; OS: tr injection  Cornea: Cl OD; OS: inferior SPK   Anterior Chamber: D+F OU    Iris: Flat OU  Lens: Cl OU    Fundus Exam: dilated with 1% tropicamide and 2.5% phenylephrine  Approval obtained from primary team for dilation  Patient aware that pupils can remained dilated for at least 4-6 hours  Exam performed with 20D lens    Vitreous: wnl OU  Disc, cup/disc: sharp and pink, 0.35 OU  Macula: wnl OU  Vessels: wnl OU  Periphery: wnl OU

## 2025-02-07 NOTE — ED PROVIDER NOTE - PATIENT PORTAL LINK FT
You can access the FollowMyHealth Patient Portal offered by [facility] by registering at the following website: [email or web address]. By joining [employer]’s FollowMyHealth portal, you will also be able to view your health information using other applications (apps) compatible with our system.

## 2025-02-07 NOTE — ED PROVIDER NOTE - CLINICAL SUMMARY MEDICAL DECISION MAKING FREE TEXT BOX
49-year-old male past medical history of facial trauma with reconstruction on 10/2024 presents to the ED complaining of left eye pain and redness.  Patient states he went to sleep and woke up with his eye red and tearing and states "he thinks something is stuck in his eye."  Patient endorses blurry vision to the affected eye.  He wears glasses and denies contact wear.  Physical exam as above    Impression: will r.o foreign body vs corneal abrasion       plan:     pain control, irrigation    Opthalmology consult (?) ; dispo pending workup 49-year-old male past medical history of facial trauma with reconstruction on 10/2024 presents to the ED complaining of left eye pain and redness.  Patient states he went to sleep and woke up with his eye red and tearing and states "he thinks something is stuck in his eye."  Patient endorses blurry vision to the affected eye.  He wears glasses and denies contact wear.  Physical exam as above    Impression: will r.o foreign body vs corneal abrasion  vs Uveitis       plan:     pain control, irrigation    Opthalmology consult (?) ; dispo pending workup

## 2025-02-07 NOTE — ED PROVIDER NOTE - PHYSICAL EXAMINATION
Vitals signed reviewed  General: Well appearing, NAD  HEENT: NC/AT, PERRLA, EOM intact with no pain, MMM    Left Eye: Mild conjunctival redness noted with tearing; no pus drainage noted       Visual acuity (corrected): 20/70     Right Eye: within normal limits        Visual acuity (corrected) 20/20   Neck: full ROM, no trachea deviation  Heart: RRR, normal s1/s2  Lungs: CTAB, normal WOB, no wheezing, rales, or rhonchi   Abdomen: Soft, non-distended, non-tender, no CVA tenderness , rebounding or guarding Vitals signed reviewed  General: Well appearing, NAD  HEENT: NC/AT, PERRLA, EOM intact with no pain, MMM    Left Eye: Mild conjunctival redness noted with tearing; no pus drainage noted       Visual acuity (corrected): 20/70     Eye pressure: 7.3mmhg     Right Eye: within normal limits        Visual acuity (corrected) 20/20        eye pressure: 7.0mmhg   Neck: full ROM, no trachea deviation  Heart: RRR, normal s1/s2  Lungs: CTAB, normal WOB, no wheezing, rales, or rhonchi   Abdomen: Soft, non-distended, non-tender, no CVA tenderness , rebounding or guarding

## 2025-02-07 NOTE — ED PROVIDER NOTE - ATTENDING CONTRIBUTION TO CARE
I was physically present for the E/M service provided. I agree with above history, physical, and plan which I have reviewed and edited where appropriate. I was physically present for the key portions of the service provided. I was physically present for the E/M service provided. I agree with above history, physical, and plan which I have reviewed and edited where appropriate. I was physically present for the key portions of the service provided..

## 2025-02-07 NOTE — ED PROVIDER NOTE - NSFOLLOWUPINSTRUCTIONS_ED_ALL_ED_FT
Chalazion    WHAT YOU NEED TO KNOW:    What is a chalazion? A chalazion is a lump on your eyelid. This lump develops because an oil gland in your eyelid is blocked. A chalazion may be small and then slowly grow bigger.    What are the signs and symptoms of a chalazion?    Small lump on your eyelid    Burning feeling in your eyelid    Drooping of your eyelid    Inflammation in your eyelid    Itching of your eye or eyelid    Trouble seeing  How is a chalazion diagnosed? Your healthcare provider will ask you when you first noticed the lump. Your provider will also ask you about your symptoms and check your eyelid carefully. You may need a biopsy if you have chalazions that keep coming back. During the biopsy, your provider will use a needle to remove a small piece of your chalazion. The piece of chalazion is then sent to a lab for tests.    How is a chalazion treated?    Warm compress: Wet a washcloth with warm water and place it on your eye. This will help decrease swelling and pain. Your healthcare provider will tell you how often to use a compress.    Steroid medicine: You may need a shot of steroid medicine in the eyelid. This medicine helps to decrease swelling.    Surgery: You may need surgery to remove your chalazion if it is large or becomes infected. During surgery, a small cut will be made on the eyelid. Your healthcare provider may also use a laser or a tool that uses heat to remove the chalazion.  What are the risks of having a chalazion?    You may need to return for more steroid shots. Steroid shots may cause the skin on your eyelid to scar or change color in the area where you got your shot. Surgery may also cause a scar to form on your eyelid. You may need more than one surgery to remove your chalazion.    If you do not have treatment for your chalazion, the chalazion may grow bigger. You may have trouble moving your eyelid. The chalazion may also cause problems with your eyesight.  When should I seek immediate care?    You have trouble moving your eyes.    Your eyelid or eye begins to bleed.    Your vision suddenly becomes worse.    Your eyelid suddenly becomes more swollen.  When should I call my doctor?    The swelling and redness on your eyelid does not get better with treatment.    You see or feel a new lump on your eyelid.    You feel pressure behind your eyes.    You have questions or concerns about your condition or care.  CARE AGREEMENT:    You have the right to help plan your care. Learn about your health condition and how it may be treated. Discuss treatment options with your healthcare providers to decide what care you want to receive. You always have the right to refuse treatment.    © [employer] 1973, 2025    	  back to top            © [employer] 1973, 2025

## 2025-02-07 NOTE — ED ADULT TRIAGE NOTE - CHIEF COMPLAINT QUOTE
Pt st" I think I got something in my left eye. It wasn't like this when I went to bed . I woke up like this...eye is all red and tearing and hurts....vision is blurry. " Hx of  facial trauma with reconstruction surgery 10/24

## 2025-02-07 NOTE — CONSULT NOTE ADULT - ASSESSMENT
49-year-old male past medical history of facial trauma with reconstruction on 10/2024 presents to the ED complaining of left eye pain and redness.    # Left eye foreign body sensation  - 49M with no PMH, previously told he had something similar to a "cold sore" in his eye  - VA 20/25+ OS, IOP wnl, no apd, color plates full  - Left eye slightly injected, REBECCA everted with mild irritation. no dendrites/pseudodendrites/epi defect   - FBS improved with tetracaine  - DFE unremarkable   - Recommend PO valtrex 1g BID for 7 days   - Recommend opical Maxitrol (neomycin/polymyxin/dexamethasone) drops BID to left eye for 1 week  - Warm compresses to left eye TID   - pt to follow up in eye clinic in 1 week, will arrange.    Seen and discussed with Dr. Luu, ophthalmology attending.     Outpatient Follow-up: Patient should follow-up with his/her ophthalmologist or with [facility] Department of Ophthalmology within 1 week of after discharge at:    [address]  [place] [address]  [phone]  
15-Oct-2021 13:38

## 2025-02-07 NOTE — CHART NOTE - NSCHARTNOTEFT_GEN_A_CORE
Ophthalmology consulted for left eye irritation and blurry vision. Full consult note to follow. Left eye with clear cornea, no uveitis, no foreign body. Possibly early left chalazion. Reports hx of left eye "cold sore" for which he was put on a pill and drops. Vision intact, otherwise normal dilated exam.     Recommendations:    - PO valtrex 1g BID for 7 days   - Topical Maxitrol (neomycin/polymyxin/dexamethasone) drops BID to left eye for 1 week  - Warm compresses to left eye TID   - pt to follow up in eye clinic in 1 week, will arrange.

## 2025-02-07 NOTE — ED PROVIDER NOTE - NS ED ATTENDING STATEMENT MOD
I have seen and examined this patient and fully participated in the care of this patient as the teaching attending.  The service was shared with the MARTHA.  I reviewed and verified the documentation.

## 2025-02-07 NOTE — CONSULT NOTE ADULT - ATTENDING COMMENTS
49-year-old male past medical history of facial trauma with reconstruction on 10/2024 presents to the ED complaining of left eye pain and redness. No foreign body seen on exam and with lid eversion. Pt with superonasal paplepral conj injection. No chalazion. No dendrites or corneal epithelial defect. Rec valtrex 1 gm BID for 7 days for now. Rec maxitrol drops BID OS for 1 week. F/u in eye clinic in 1 week.

## 2025-02-07 NOTE — ED PROVIDER NOTE - OBJECTIVE STATEMENT
49-year-old male past medical history of facial trauma with reconstruction on 10/2024 presents to the ED complaining of left eye pain and redness.  Patient states he went to sleep and woke up with his eye red and tearing and states "he thinks something is stuck in his eye."  Patient endorses blurry vision to the affected eye.  He wears glasses and denies contact wear. Patient denies chest pain, shortness of breath, fevers, chills, nausea, vomiting, visual loss, headache or any other concerning symptoms at this time.

## 2025-03-31 NOTE — ED PROVIDER NOTE - CARE PLAN
Principal Discharge DX:	Corneal abrasion   Detail Level: Detailed Quality 226: Preventive Care And Screening: Tobacco Use: Screening And Cessation Intervention: Patient screened for tobacco use and is an ex/non-smoker 1